# Patient Record
Sex: FEMALE | Race: WHITE | NOT HISPANIC OR LATINO | URBAN - METROPOLITAN AREA
[De-identification: names, ages, dates, MRNs, and addresses within clinical notes are randomized per-mention and may not be internally consistent; named-entity substitution may affect disease eponyms.]

---

## 2017-01-13 ENCOUNTER — IMPORTED ENCOUNTER (OUTPATIENT)
Dept: URBAN - METROPOLITAN AREA CLINIC 31 | Facility: CLINIC | Age: 42
End: 2017-01-13

## 2017-01-13 PROBLEM — H18.832: Noted: 2017-01-13

## 2017-01-13 PROCEDURE — 92071 CONTACT LENS FITTING FOR TX: CPT

## 2017-01-13 PROCEDURE — 99213 OFFICE O/P EST LOW 20 MIN: CPT

## 2017-01-13 NOTE — PATIENT DISCUSSION
1.  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 Start Polytrim QID. To call if any problems. 2. Return for an appointment in 3 days for office call. with Dr. Sujatha Castillo.

## 2017-01-16 ENCOUNTER — IMPORTED ENCOUNTER (OUTPATIENT)
Dept: URBAN - METROPOLITAN AREA CLINIC 31 | Facility: CLINIC | Age: 42
End: 2017-01-16

## 2017-01-16 PROBLEM — H18.832: Noted: 2017-01-16

## 2017-01-16 PROCEDURE — 99213 OFFICE O/P EST LOW 20 MIN: CPT

## 2017-01-16 NOTE — PATIENT DISCUSSION
1.  Corneal erosion healed. BCL removed without incident. DC Polytrim and start Systane Balance QID. 2. Return for an appointment in 8 months for comprehensive exam. with Dr. Morelia Griffith.

## 2017-02-03 ENCOUNTER — IMPORTED ENCOUNTER (OUTPATIENT)
Dept: URBAN - METROPOLITAN AREA CLINIC 31 | Facility: CLINIC | Age: 42
End: 2017-02-03

## 2017-02-03 PROBLEM — H18.832: Noted: 2017-02-03

## 2017-02-03 PROCEDURE — 99213 OFFICE O/P EST LOW 20 MIN: CPT

## 2017-02-03 PROCEDURE — 92071 CONTACT LENS FITTING FOR TX: CPT

## 2017-02-03 NOTE — PATIENT DISCUSSION
1.  BCL AV Oasys plano 8.4 BC with Besivance. Start Polytrim QID again. To call if any worsening. May need to wear BCL longer and use Latrice 128 brigitte HS after. 2. Return for an appointment in 4 days for office call. with Dr. Brian Segovia. 3.  Recurrent Erosion of Cornea OS - pain upon opening eyes.

## 2019-07-25 ENCOUNTER — IMPORTED ENCOUNTER (OUTPATIENT)
Dept: URBAN - METROPOLITAN AREA CLINIC 31 | Facility: CLINIC | Age: 44
End: 2019-07-25

## 2019-07-25 PROBLEM — H18.59: Noted: 2019-07-25

## 2019-07-25 PROCEDURE — 92014 COMPRE OPH EXAM EST PT 1/>: CPT

## 2019-07-25 NOTE — PATIENT DISCUSSION
Reviewed ABMD and risk of corneal erosions and if central visual distortion. Recommend clinical observation. Return for an appointment for contact lens evaluation - the patient wants something to make OS more sharp. I do not feel that glasses would help much given plano Rx and EBMD. Early presbyopia. next available.  with Dr. Abbi ordoñez.

## 2019-08-07 ENCOUNTER — IMPORTED ENCOUNTER (OUTPATIENT)
Dept: URBAN - METROPOLITAN AREA CLINIC 31 | Facility: CLINIC | Age: 44
End: 2019-08-07

## 2019-08-07 PROBLEM — H04.123: Noted: 2019-08-07

## 2019-08-07 PROBLEM — H18.59: Noted: 2019-08-07

## 2019-08-07 PROCEDURE — 99213 OFFICE O/P EST LOW 20 MIN: CPT

## 2019-08-07 PROCEDURE — 92015 DETERMINE REFRACTIVE STATE: CPT

## 2019-08-07 NOTE — PATIENT DISCUSSION
1.  Reviewed ABMD and risk of corneal erosions and if central visual distortion. Recommend clinical observation artificial tear lubrication during the day and Latrice 128ung qhs2. Dry Eyes OU:  Start artificials tears. Encouraged regular use. 3.  Refractive error - SV readers4. Return for an appointment in 1 year for comprehensive exam. with Dr. Jp Barkley.

## 2020-09-14 ENCOUNTER — OFFICE VISIT (OUTPATIENT)
Dept: URBAN - METROPOLITAN AREA CLINIC 121 | Facility: CLINIC | Age: 45
End: 2020-09-14

## 2021-06-14 ENCOUNTER — OFFICE VISIT (OUTPATIENT)
Dept: URBAN - METROPOLITAN AREA CLINIC 63 | Facility: CLINIC | Age: 46
End: 2021-06-14

## 2021-07-16 ENCOUNTER — OFFICE VISIT (OUTPATIENT)
Dept: URBAN - METROPOLITAN AREA MEDICAL CENTER 14 | Facility: MEDICAL CENTER | Age: 46
End: 2021-07-16

## 2021-07-27 ENCOUNTER — OFFICE VISIT (OUTPATIENT)
Dept: URBAN - METROPOLITAN AREA CLINIC 63 | Facility: CLINIC | Age: 46
End: 2021-07-27

## 2022-03-28 ENCOUNTER — OFFICE VISIT (OUTPATIENT)
Dept: URBAN - METROPOLITAN AREA TELEHEALTH 2 | Facility: TELEHEALTH | Age: 47
End: 2022-03-28

## 2022-04-02 ASSESSMENT — VISUAL ACUITY
OS_CC: 20/25-2
OS_CC: 20/40
OD_CC: 20/20
OS_CC: 20/20-2
OS_CC: 20/40-1
OD_CC: 20/20
OS_CC: 20/25+2
OD_CC: 20/20
OU_CC: 20/20

## 2022-04-02 ASSESSMENT — TONOMETRY
OS_IOP_MMHG: 10
OD_IOP_MMHG: 10

## 2022-07-09 ENCOUNTER — TELEPHONE ENCOUNTER (OUTPATIENT)
Dept: URBAN - METROPOLITAN AREA CLINIC 121 | Facility: CLINIC | Age: 47
End: 2022-07-09

## 2022-07-09 RX ORDER — OMEGA-3/DHA/EPA/FISH OIL 1000 MG
CAPSULE ORAL
Refills: 0 | OUTPATIENT
Start: 2014-07-15 | End: 2018-08-27

## 2022-07-09 RX ORDER — OMEGA-3/DHA/EPA/FISH OIL 1000 MG
CAPSULE ORAL ONCE A DAY
Refills: 0 | OUTPATIENT
Start: 2018-08-27 | End: 2018-08-27

## 2022-07-09 RX ORDER — CHROMIUM 200 MCG
TABLET ORAL ONCE A DAY
Refills: 0 | OUTPATIENT
Start: 2018-08-27 | End: 2021-06-14

## 2022-07-09 RX ORDER — CHROMIUM 200 MCG
TABLET ORAL
Refills: 0 | OUTPATIENT
Start: 2014-07-15 | End: 2018-08-27

## 2022-07-10 ENCOUNTER — TELEPHONE ENCOUNTER (OUTPATIENT)
Dept: URBAN - METROPOLITAN AREA CLINIC 121 | Facility: CLINIC | Age: 47
End: 2022-07-10

## 2022-07-10 RX ORDER — RIFAXIMIN 550 MG/1
TWICE A DAY TABLET ORAL TWICE A DAY
Refills: 0 | Status: ACTIVE | COMMUNITY
Start: 2022-03-28

## 2022-07-10 RX ORDER — CHROMIUM 200 MCG
TABLET ORAL ONCE A DAY
Refills: 0 | Status: ACTIVE | COMMUNITY
Start: 2021-06-14

## 2022-07-10 RX ORDER — DICYCLOMINE HYDROCHLORIDE 20 MG/2ML
TWICE A DAY AS NEEDED FOR ABDOMINAL CRAMPS INJECTION, SOLUTION INTRAMUSCULAR TWICE A DAY
Refills: 4 | Status: ACTIVE | COMMUNITY
Start: 2018-08-30

## 2022-07-10 RX ORDER — OMEPRAZOLE 40 MG/1
TWICE A DAY, PO, QAM AND QHS CAPSULE, DELAYED RELEASE ORAL TWICE A DAY
Refills: 1 | Status: ACTIVE | COMMUNITY
Start: 2021-08-02

## 2022-07-10 RX ORDER — OMEPRAZOLE 40 MG/1
CAPSULE, DELAYED RELEASE ORAL ONCE A DAY
Refills: 0 | Status: ACTIVE | COMMUNITY
Start: 2021-07-27

## 2022-07-10 RX ORDER — OMEPRAZOLE 40 MG/1
ONCE A DAY CAPSULE, DELAYED RELEASE ORAL ONCE A DAY
Refills: 0 | Status: ACTIVE | COMMUNITY
Start: 2021-07-30

## 2022-07-10 RX ORDER — LIDOCAINE HYDROCHLORIDE 20 MG/ML
TAKE 10 ML EVERY 3-4 HOURS AS NEEDED SOLUTION TOPICAL
Refills: 1 | Status: ACTIVE | COMMUNITY
Start: 2021-07-17

## 2022-07-10 RX ORDER — LINACLOTIDE 72 UG/1
ONCE A DAY, PO, QAM CAPSULE, GELATIN COATED ORAL ONCE A DAY
Refills: 3 | Status: ACTIVE | COMMUNITY
Start: 2021-07-27

## 2022-07-28 ENCOUNTER — DASHBOARD ENCOUNTERS (OUTPATIENT)
Age: 47
End: 2022-07-28

## 2022-07-28 ENCOUNTER — OFFICE VISIT (OUTPATIENT)
Dept: URBAN - METROPOLITAN AREA CLINIC 60 | Facility: CLINIC | Age: 47
End: 2022-07-28
Payer: COMMERCIAL

## 2022-07-28 ENCOUNTER — WEB ENCOUNTER (OUTPATIENT)
Dept: URBAN - METROPOLITAN AREA CLINIC 60 | Facility: CLINIC | Age: 47
End: 2022-07-28

## 2022-07-28 VITALS
DIASTOLIC BLOOD PRESSURE: 70 MMHG | BODY MASS INDEX: 23.39 KG/M2 | HEART RATE: 67 BPM | HEIGHT: 63 IN | TEMPERATURE: 97.8 F | WEIGHT: 132 LBS | SYSTOLIC BLOOD PRESSURE: 110 MMHG | OXYGEN SATURATION: 97 %

## 2022-07-28 DIAGNOSIS — K58.1 IRRITABLE BOWEL SYNDROME WITH CONSTIPATION: ICD-10-CM

## 2022-07-28 DIAGNOSIS — K21.00 GASTRO-ESOPHAGEAL REFLUX DISEASE WITH ESOPHAGITIS, WITHOUT BLEEDING: ICD-10-CM

## 2022-07-28 PROBLEM — 266433003: Status: ACTIVE | Noted: 2022-07-28

## 2022-07-28 PROBLEM — 440630006: Status: ACTIVE | Noted: 2022-07-28

## 2022-07-28 PROCEDURE — 99213 OFFICE O/P EST LOW 20 MIN: CPT | Performed by: NURSE PRACTITIONER

## 2022-07-28 RX ORDER — RIFAXIMIN 550 MG/1
TWICE A DAY TABLET ORAL TWICE A DAY
Refills: 0 | Status: ON HOLD | COMMUNITY
Start: 2022-03-28

## 2022-07-28 RX ORDER — DICYCLOMINE HYDROCHLORIDE 20 MG/2ML
TWICE A DAY AS NEEDED FOR ABDOMINAL CRAMPS INJECTION, SOLUTION INTRAMUSCULAR TWICE A DAY
Refills: 4 | Status: ON HOLD | COMMUNITY
Start: 2018-08-30

## 2022-07-28 RX ORDER — OMEPRAZOLE 40 MG/1
ONCE A DAY CAPSULE, DELAYED RELEASE ORAL ONCE A DAY
Refills: 0 | Status: ACTIVE | COMMUNITY
Start: 2021-07-30

## 2022-07-28 RX ORDER — CHROMIUM 200 MCG
TABLET ORAL ONCE A DAY
Refills: 0 | Status: ACTIVE | COMMUNITY
Start: 2021-06-14

## 2022-07-28 RX ORDER — HYOSCYAMINE SULFATE 0.12 MG/1
1-2 TABLET UNDER THE TONGUE AND ALLOW TO DISSOLVE  AS NEEDED TABLET SUBLINGUAL
Qty: 540 | Refills: 1 | OUTPATIENT

## 2022-07-28 RX ORDER — LIDOCAINE HYDROCHLORIDE 20 MG/ML
TAKE 10 ML EVERY 3-4 HOURS AS NEEDED SOLUTION TOPICAL
Refills: 1 | Status: ACTIVE | COMMUNITY
Start: 2021-07-17

## 2022-07-28 RX ORDER — LINACLOTIDE 72 UG/1
ONCE A DAY, PO, QAM CAPSULE, GELATIN COATED ORAL ONCE A DAY
Refills: 3 | Status: ACTIVE | COMMUNITY
Start: 2021-07-27

## 2022-07-28 RX ORDER — LUBIPROSTONE 8 UG/1
1 CAPSULE WITH FOOD AND WATER CAPSULE, GELATIN COATED ORAL TWICE A DAY
Qty: 60 | Refills: 1 | OUTPATIENT

## 2022-07-28 NOTE — HPI-PREVIOUS PROCEDURES
EGD with dilation/16 July 2021.  1.  Schatzki's ring, status post dilation with a 56 Turkish Hawley dilator, encountering moderate resistance.  Reinspection revealed moderate improvement in luminal opening.  2.  Hiatal hernia.  3.  Gastric fundal polyp.  4.  Minimal antral gastritis. IMPRESSION AND RECOMMENDATIONS: The patient has an S-ring and hernia noted.  Her response to the dilation will be monitored. PATHOLOGY: Mild chronic gastritis and prominent reactive lymphoid aggregate in the gastric polyp biopsy with all remaining findings negative or benign. ************** Colonoscopy/30 August 2018.  Internal hemorrhoids present, no specimens collected.  Recommend repeat colonoscopy in 10 years for colorectal cancer screening purposes.

## 2022-07-28 NOTE — HPI-TODAY'S VISIT:
Thank you very much for kindly referring Jessica Melvin, a very pleasant 47-year-old female, back to our service due to abdominal discomfort, diarrhea and for discussion of medications.  Past medical history significant for GERD, Schatzki's ring and IBS-C.  Past surgical history significant for tubal ligation with reversal and breast augmentation.  Her last EGD with dilation was 16 July 2021 (see results below) and last colonoscopy was 30 August 2018 which was unremarkable. Jessica presents today complaining of irregular bowel movements with diarrhea while using Linzess, 72 mcg for 2 days on and 1 day off.  If she does not use it she will not have a bowel movement for weeks due to a longstanding history of constipation.  Additionally, her abdominal discomfort was poorly addressed with dicyclomine and she stopped using it.  She admits most of her symptoms are resolved when she was on vacation, but relates they came back upon return to work.  She does not do anything for fun on a regular basis and freely admits she does not have a good stress outlet.  She is otherwise asymptomatic and uses omeprazole, 40 mg once daily with fairly good efficacy.

## 2022-11-22 ENCOUNTER — TELEPHONE ENCOUNTER (OUTPATIENT)
Dept: URBAN - METROPOLITAN AREA CLINIC 60 | Facility: CLINIC | Age: 47
End: 2022-11-22

## 2022-11-22 RX ORDER — LINACLOTIDE 72 UG/1
ONCE A DAY, PO, QAM CAPSULE, GELATIN COATED ORAL ONCE A DAY
Qty: 90 | Refills: 2

## 2024-12-11 ENCOUNTER — P2P PATIENT RECORD (OUTPATIENT)
Age: 49
End: 2024-12-11

## 2025-06-04 ENCOUNTER — APPOINTMENT (OUTPATIENT)
Dept: URBAN - METROPOLITAN AREA CLINIC 114 | Facility: CLINIC | Age: 50
Setting detail: DERMATOLOGY
End: 2025-06-04

## 2025-06-04 DIAGNOSIS — L81.4 OTHER MELANIN HYPERPIGMENTATION: ICD-10-CM

## 2025-06-04 DIAGNOSIS — L82.1 OTHER SEBORRHEIC KERATOSIS: ICD-10-CM

## 2025-06-04 DIAGNOSIS — D18.0 HEMANGIOMA: ICD-10-CM

## 2025-06-04 DIAGNOSIS — D22 MELANOCYTIC NEVI: ICD-10-CM

## 2025-06-04 DIAGNOSIS — Z71.89 OTHER SPECIFIED COUNSELING: ICD-10-CM

## 2025-06-04 DIAGNOSIS — L82.0 INFLAMED SEBORRHEIC KERATOSIS: ICD-10-CM

## 2025-06-04 PROBLEM — D22.71 MELANOCYTIC NEVI OF RIGHT LOWER LIMB, INCLUDING HIP: Status: ACTIVE | Noted: 2025-06-04

## 2025-06-04 PROBLEM — D22.5 MELANOCYTIC NEVI OF TRUNK: Status: ACTIVE | Noted: 2025-06-04

## 2025-06-04 PROBLEM — D18.01 HEMANGIOMA OF SKIN AND SUBCUTANEOUS TISSUE: Status: ACTIVE | Noted: 2025-06-04

## 2025-06-04 PROCEDURE — ?

## 2025-06-04 PROCEDURE — ? SUNSCREEN RECOMMENDATIONS

## 2025-06-04 PROCEDURE — ? COUNSELING

## 2025-06-04 PROCEDURE — ?: Mod: 25

## 2025-06-04 PROCEDURE — ? LIQUID NITROGEN

## 2025-06-04 ASSESSMENT — LOCATION DETAILED DESCRIPTION DERM
LOCATION DETAILED: RIGHT PROXIMAL LATERAL CALF
LOCATION DETAILED: RIGHT MID-UPPER BACK
LOCATION DETAILED: LEFT CLAVICULAR NECK
LOCATION DETAILED: LEFT MEDIAL UPPER BACK
LOCATION DETAILED: EPIGASTRIC SKIN
LOCATION DETAILED: LEFT SUPERIOR UPPER BACK

## 2025-06-04 ASSESSMENT — LOCATION ZONE DERM
LOCATION ZONE: TRUNK
LOCATION ZONE: NECK
LOCATION ZONE: LEG

## 2025-06-04 ASSESSMENT — LOCATION SIMPLE DESCRIPTION DERM
LOCATION SIMPLE: ABDOMEN
LOCATION SIMPLE: RIGHT UPPER BACK
LOCATION SIMPLE: LEFT ANTERIOR NECK
LOCATION SIMPLE: RIGHT CALF
LOCATION SIMPLE: LEFT UPPER BACK

## 2025-06-04 ASSESSMENT — PAIN INTENSITY VAS
HOW INTENSE IS YOUR PAIN 0 BEING NO PAIN, 10 BEING THE MOST SEVERE PAIN POSSIBLE?: 1/10 PAIN
HOW INTENSE IS YOUR PAIN 0 BEING NO PAIN, 10 BEING THE MOST SEVERE PAIN POSSIBLE?: NO PAIN

## 2025-06-04 NOTE — PROCEDURE: LIQUID NITROGEN
Detail Level: Detailed
Render Note In Bullet Format When Appropriate: No
Medical Necessity Information: It is in your best interest to select a reason for this procedure from the list below. All of these items fulfill various CMS LCD requirements except the new and changing color options.
Consent: The patient's consent was obtained including but not limited to risks of crusting, scabbing, blistering, scarring, darker or lighter pigmentary change, recurrence, incomplete removal and infection.
Show Topical Anesthesia Variable?: Yes
Spray Paint Text: The liquid nitrogen was applied to the skin utilizing a spray paint frosting technique.
Post-Care Instructions: I reviewed with the patient in detail post-care instructions. Patient is to wear sunprotection, and avoid picking at any of the treated lesions. Pt may apply Vaseline to crusted or scabbing areas.
Medical Necessity Clause: This procedure was medically necessary because the lesions that were treated were: